# Patient Record
Sex: MALE | URBAN - METROPOLITAN AREA
[De-identification: names, ages, dates, MRNs, and addresses within clinical notes are randomized per-mention and may not be internally consistent; named-entity substitution may affect disease eponyms.]

---

## 2023-09-11 ENCOUNTER — CLINICAL DOCUMENTATION (OUTPATIENT)
Age: 53
End: 2023-09-11

## 2023-09-11 NOTE — PROGRESS NOTES
Records received from Dr Elvie Dunaway  and per updated office protocol do not meet criteria for further review.  Schedule next available

## 2024-03-11 ENCOUNTER — HOSPITAL ENCOUNTER (OUTPATIENT)
Facility: HOSPITAL | Age: 54
Discharge: HOME | End: 2024-03-14
Attending: FAMILY MEDICINE
Payer: COMMERCIAL

## 2024-03-11 DIAGNOSIS — R74.8 ABNORMAL LEVELS OF OTHER SERUM ENZYMES: ICD-10-CM

## 2024-03-11 PROCEDURE — 76705 ECHO EXAM OF ABDOMEN: CPT

## 2024-03-29 ENCOUNTER — OFFICE VISIT (OUTPATIENT)
Age: 54
End: 2024-03-29

## 2024-03-29 VITALS
TEMPERATURE: 98.4 F | HEIGHT: 70 IN | HEART RATE: 64 BPM | DIASTOLIC BLOOD PRESSURE: 78 MMHG | OXYGEN SATURATION: 97 % | BODY MASS INDEX: 27.66 KG/M2 | WEIGHT: 193.2 LBS | SYSTOLIC BLOOD PRESSURE: 142 MMHG

## 2024-03-29 DIAGNOSIS — Z95.5 HISTORY OF CORONARY ARTERY STENT PLACEMENT: ICD-10-CM

## 2024-03-29 DIAGNOSIS — E78.00 HYPERCHOLESTEROLEMIA: ICD-10-CM

## 2024-03-29 DIAGNOSIS — I25.2 H/O ACUTE MYOCARDIAL INFARCTION: Primary | ICD-10-CM

## 2024-03-29 DIAGNOSIS — R74.8 ELEVATED LIVER ENZYMES: ICD-10-CM

## 2024-03-29 LAB
ALBUMIN SERPL-MCNC: 4.3 G/DL (ref 3.5–5)
ALBUMIN/GLOB SERPL: 1.3 (ref 1.1–2.2)
ALP SERPL-CCNC: 51 U/L (ref 45–117)
ALT SERPL-CCNC: 39 U/L (ref 12–78)
ANION GAP SERPL CALC-SCNC: 5 MMOL/L (ref 5–15)
AST SERPL-CCNC: 21 U/L (ref 15–37)
BASOPHILS # BLD: 0 K/UL (ref 0–0.1)
BASOPHILS NFR BLD: 0 % (ref 0–1)
BILIRUB DIRECT SERPL-MCNC: 0.2 MG/DL (ref 0–0.2)
BILIRUB SERPL-MCNC: 0.5 MG/DL (ref 0.2–1)
BUN SERPL-MCNC: 13 MG/DL (ref 6–20)
BUN/CREAT SERPL: 16 (ref 12–20)
CALCIUM SERPL-MCNC: 9.3 MG/DL (ref 8.5–10.1)
CHLORIDE SERPL-SCNC: 106 MMOL/L (ref 97–108)
CO2 SERPL-SCNC: 28 MMOL/L (ref 21–32)
COMMENT:: NORMAL
CREAT SERPL-MCNC: 0.8 MG/DL (ref 0.7–1.3)
DIFFERENTIAL METHOD BLD: NORMAL
EOSINOPHIL # BLD: 0.2 K/UL (ref 0–0.4)
EOSINOPHIL NFR BLD: 3 % (ref 0–7)
ERYTHROCYTE [DISTWIDTH] IN BLOOD BY AUTOMATED COUNT: 13.7 % (ref 11.5–14.5)
GLOBULIN SER CALC-MCNC: 3.2 G/DL (ref 2–4)
GLUCOSE SERPL-MCNC: 100 MG/DL (ref 65–100)
HBV SURFACE AB SER QL: NONREACTIVE
HBV SURFACE AB SER-ACNC: <3.1 MIU/ML
HBV SURFACE AG SER QL: <0.1 INDEX
HBV SURFACE AG SER QL: NEGATIVE
HCT VFR BLD AUTO: 45.6 % (ref 36.6–50.3)
HGB BLD-MCNC: 15 G/DL (ref 12.1–17)
IMM GRANULOCYTES # BLD AUTO: 0 K/UL (ref 0–0.04)
IMM GRANULOCYTES NFR BLD AUTO: 0 % (ref 0–0.5)
LYMPHOCYTES # BLD: 2 K/UL (ref 0.8–3.5)
LYMPHOCYTES NFR BLD: 34 % (ref 12–49)
MCH RBC QN AUTO: 30.5 PG (ref 26–34)
MCHC RBC AUTO-ENTMCNC: 32.9 G/DL (ref 30–36.5)
MCV RBC AUTO: 92.9 FL (ref 80–99)
MONOCYTES # BLD: 0.5 K/UL (ref 0–1)
MONOCYTES NFR BLD: 8 % (ref 5–13)
NEUTS SEG # BLD: 3.3 K/UL (ref 1.8–8)
NEUTS SEG NFR BLD: 55 % (ref 32–75)
NRBC # BLD: 0 K/UL (ref 0–0.01)
NRBC BLD-RTO: 0 PER 100 WBC
PLATELET # BLD AUTO: 180 K/UL (ref 150–400)
PMV BLD AUTO: 10.9 FL (ref 8.9–12.9)
POTASSIUM SERPL-SCNC: 4.1 MMOL/L (ref 3.5–5.1)
PROT SERPL-MCNC: 7.5 G/DL (ref 6.4–8.2)
RBC # BLD AUTO: 4.91 M/UL (ref 4.1–5.7)
SODIUM SERPL-SCNC: 139 MMOL/L (ref 136–145)
SPECIMEN HOLD: NORMAL
WBC # BLD AUTO: 6 K/UL (ref 4.1–11.1)

## 2024-03-29 RX ORDER — FAMOTIDINE, CALCIUM CARBONATE, AND MAGNESIUM HYDROXIDE 10; 800; 165 MG/1; MG/1; MG/1
TABLET, CHEWABLE ORAL
COMMUNITY
Start: 2017-08-07

## 2024-03-29 RX ORDER — EVOLOCUMAB 140 MG/ML
INJECTION, SOLUTION SUBCUTANEOUS
COMMUNITY

## 2024-03-29 RX ORDER — ASPIRIN 81 MG/1
1 TABLET ORAL DAILY
COMMUNITY

## 2024-03-29 RX ORDER — EVOLOCUMAB 420 MG/3.5
KIT SUBCUTANEOUS
COMMUNITY
Start: 2024-01-29

## 2024-03-29 RX ORDER — TRAZODONE HYDROCHLORIDE 100 MG/1
100 TABLET ORAL
COMMUNITY

## 2024-03-29 RX ORDER — ATENOLOL 25 MG/1
1 TABLET ORAL DAILY
COMMUNITY

## 2024-03-29 RX ORDER — TRAZODONE HYDROCHLORIDE 50 MG/1
1 TABLET ORAL DAILY
COMMUNITY
End: 2024-04-21

## 2024-03-29 RX ORDER — DOXYCYCLINE HYCLATE 100 MG
1 TABLET ORAL DAILY
COMMUNITY
End: 2024-03-29

## 2024-03-29 RX ORDER — ROSUVASTATIN CALCIUM 40 MG/1
1 TABLET, COATED ORAL DAILY
COMMUNITY

## 2024-03-29 RX ORDER — MULTIVIT-MIN/FERROUS GLUCONATE 9 MG/15 ML
15 LIQUID (ML) ORAL DAILY
COMMUNITY

## 2024-03-29 RX ORDER — EZETIMIBE 10 MG/1
TABLET ORAL
COMMUNITY

## 2024-03-29 ASSESSMENT — PATIENT HEALTH QUESTIONNAIRE - PHQ9
SUM OF ALL RESPONSES TO PHQ QUESTIONS 1-9: 0
SUM OF ALL RESPONSES TO PHQ9 QUESTIONS 1 & 2: 0
1. LITTLE INTEREST OR PLEASURE IN DOING THINGS: NOT AT ALL
2. FEELING DOWN, DEPRESSED OR HOPELESS: NOT AT ALL
SUM OF ALL RESPONSES TO PHQ QUESTIONS 1-9: 0

## 2024-03-29 NOTE — PROGRESS NOTES
Identified pt with two pt identifiers(name and ). Reviewed record in preparation for visit and have obtained necessary documentation.    Chief Complaint   Patient presents with    New Patient     Establish care      BP (!) 142/78 (Site: Left Upper Arm, Position: Sitting, Cuff Size: Large Adult)   Pulse 64   Temp 98.4 °F (36.9 °C)   Ht 1.765 m (5' 9.5\")   Wt 87.6 kg (193 lb 3.2 oz)   SpO2 97%   BMI 28.12 kg/m²       1. \"Have you been to the ER, urgent care clinic since your last visit?  Hospitalized since your last [facility]?\" No    2. \"Have you seen or consulted any other health care providers outside of the [facility] since your last visit?\" Yes Cardiology      Patient is accompanied by self I have received verbal consent from Arvind Guzman to discuss any/all medical information while they are present in the room.      
in the right side over the liver,     The patient completes all daily activities without any functional limitations.      ASSESSMENT AND PLAN:  Elevated liver enzymes  Intermittent elevation in liver transaminases of unclear etiology at this time.      Have performed laboratory testing to monitor liver function and degree of liver injury.  This included BMP, hepatic panel, CBC with platelet count,     Liver transaminases are now normal.  ALP is normal.  Liver function is normal.  The platelet count is normal.      Based upon laboratory studies, Fibroscan, Elastography, and imaging the patient does not appear to have liver injury.    Serologic testing for causes of chronic liver disease was positive for BERNADINE     The most likely causes for the liver chemistry abnormalities were discussed with the patient and include alcohol or an immune liver disease    The Fibroscan suggests there is no liver injury at this time.    A liver biopsy is not necessary at this time.    Liver enzymes and Fibroscan will be utilized to monitor the patient at 6 month intervals.    Although the amount of alcohol consumed by the patient is within the range that is considered to be safe and not thought to cause liver injury, this does not mean it cannot cause intermittent mild elevation in liver enzymes.    I do not see any reason why he has to stop consuming alcohol at this time.  I have discussed with him that it is not good to drink alcohol every day and that he should limit alcohol to 3 drinks over any given period (1-3) before taking a day off from alcohol.    If the liver enzymes remain elevated or the liver stiffness by Fibroscan increases than a liver biopsy should be considered to define the etiology for the liver disease.      Positive serology for autoimmune liver disease  The positive BERNADINE suggests there the may be an underlying autoimmune liver disease.    The liver enzymes have returned to normal.  It is unlikely there is an active

## 2024-03-30 LAB
HAV AB SER QL IA: POSITIVE
HBV CORE AB SERPL QL IA: NEGATIVE
HCV AB SERPL QL IA: NORMAL
HCV IGG SERPL QL IA: NON REACTIVE S/CO RATIO

## 2024-03-31 LAB
A1AT SERPL-MCNC: 134 MG/DL (ref 101–187)
CERULOPLASMIN SERPL-MCNC: 17.8 MG/DL (ref 16–31)
SMA IGG SER-ACNC: 8 UNITS (ref 0–19)

## 2024-04-03 LAB
ANA BY IFA: POSITIVE
HOMOGENEOUS PATTERN: ABNORMAL
Lab: ABNORMAL

## 2024-04-11 LAB
HFE GENE MUT ANL BLD/T: NORMAL
REVIEWED BY: NORMAL

## 2024-10-09 ENCOUNTER — OFFICE VISIT (OUTPATIENT)
Age: 54
End: 2024-10-09
Payer: COMMERCIAL

## 2024-10-09 VITALS
DIASTOLIC BLOOD PRESSURE: 88 MMHG | TEMPERATURE: 98.2 F | WEIGHT: 196.4 LBS | HEIGHT: 69 IN | OXYGEN SATURATION: 98 % | SYSTOLIC BLOOD PRESSURE: 140 MMHG | BODY MASS INDEX: 29.09 KG/M2 | HEART RATE: 69 BPM

## 2024-10-09 DIAGNOSIS — R74.8 ELEVATED LIVER ENZYMES: Primary | ICD-10-CM

## 2024-10-09 PROCEDURE — 99214 OFFICE O/P EST MOD 30 MIN: CPT | Performed by: NURSE PRACTITIONER

## 2024-10-09 PROCEDURE — 91200 LIVER ELASTOGRAPHY: CPT | Performed by: NURSE PRACTITIONER

## 2024-10-09 NOTE — PROGRESS NOTES
[facility]      Arsh Maier MD, FACP, FACG, FAASLD      ALEJANDRO Topete-AZUCENA Beatty, [facility]   Citlaly Timolivedavid, [facility]   Nicol Arturo, [facility]  Jamie Grey, [facility]   Cassandra Bowman, [facility]   Erika Aston, [facility]   [address], [address]   [place]  [address] [phone]   FAX: [phone]  [facility]   [address], [address]   [place]  [address] [phone]   FAX: [phone]       Patient Care Team:  Chas Vanessa MD as PCP - General (Family Medicine)            Arvind Guzman is being seen at [facility] for management of elevated liver enzymes. The active problem list, all pertinent past medical history, medications, liver histology, radiologic findings and laboratory findings related to the liver disorder were reviewed and discussed with the patient.      The patient is a 54 y.o. male who was found to have elevated liver transaminases in 8/2023.      Serologic evaluation for markers of chronic liver disease was positive for BERNADINE.    The patient had been consuming abot 10 alcohol drinks every week.      The most recent imaging of the liver was Ultrasound performed in 3/2024.  Results suggest that the liver is normal.      Assessment of liver fibrosis with Fibroscan was performed in 3/2024.  The result was 4.8 kPa which correlates with no fibrosis.  The CAP score of 213 suggests there is no hepatic steatosis.    In the office today the patient has the following symptoms:  The patient feels well and has no complaints.    The patient is not experiencing the following symptoms which are commonly seen in this liver disorder:   fatigue,   pain in the right side over the liver,     The patient completes all daily activities without any

## 2024-10-09 NOTE — PROGRESS NOTES
Chief Complaint   Patient presents with    Follow-up     \"Have you been to the ER, urgent care clinic since your last visit?  Hospitalized since your last visit?\"    NO    “Have you seen or consulted any other health care providers outside our system since your last visit?”    NO      “Have you had a colorectal cancer screening such as a colonoscopy/FIT/Cologuard?    NO    Vitals:    10/09/24 1412   BP: (!) 140/88   Pulse: 69   Temp:    SpO2:        No colonoscopy on file  No cologuard on file  No FIT/FOBT on file   No flexible sigmoidoscopy on file